# Patient Record
Sex: MALE | Race: WHITE | NOT HISPANIC OR LATINO | URBAN - METROPOLITAN AREA
[De-identification: names, ages, dates, MRNs, and addresses within clinical notes are randomized per-mention and may not be internally consistent; named-entity substitution may affect disease eponyms.]

---

## 2023-07-25 LAB
APPEARANCE, URINE: CLEAR
BILIRUBIN, URINE: NEGATIVE
BLOOD, URINE: NEGATIVE
CALCIDIOL (25 OH VITAMIN D3) (NG/ML) IN SER/PLAS: 58 NG/ML
CHOLESTEROL (MG/DL) IN SER/PLAS: 116 MG/DL (ref 0–199)
CHOLESTEROL IN HDL (MG/DL) IN SER/PLAS: 53.3 MG/DL
CHOLESTEROL/HDL RATIO: 2.2
COLOR, URINE: YELLOW
GLUCOSE, URINE: NEGATIVE MG/DL
KETONES, URINE: NEGATIVE MG/DL
LDL: 47 MG/DL (ref 0–99)
LEUKOCYTE ESTERASE, URINE: NEGATIVE
NITRITE, URINE: NEGATIVE
PH, URINE: 6 (ref 5–8)
PROSTATE SPECIFIC ANTIGEN,SCREEN: 1.63 NG/ML (ref 0–4)
PROTEIN, URINE: NEGATIVE MG/DL
SPECIFIC GRAVITY, URINE: 1.02 (ref 1–1.03)
THYROTROPIN (MIU/L) IN SER/PLAS BY DETECTION LIMIT <= 0.05 MIU/L: 1.84 MIU/L (ref 0.44–3.98)
THYROXINE (T4) FREE (NG/DL) IN SER/PLAS: 1.07 NG/DL (ref 0.78–1.48)
TRIGLYCERIDE (MG/DL) IN SER/PLAS: 81 MG/DL (ref 0–149)
URATE (MG/DL) IN SER/PLAS: 3.6 MG/DL (ref 4–7.5)
UROBILINOGEN, URINE: <2 MG/DL (ref 0–1.9)
VLDL: 16 MG/DL (ref 0–40)

## 2023-07-26 LAB — URINE CULTURE: NORMAL

## 2024-07-31 ENCOUNTER — LAB REQUISITION (OUTPATIENT)
Dept: LAB | Facility: LAB | Age: 79
End: 2024-07-31

## 2024-07-31 DIAGNOSIS — E78.5 HYPERLIPIDEMIA, UNSPECIFIED: ICD-10-CM

## 2024-07-31 DIAGNOSIS — Z12.5 ENCOUNTER FOR SCREENING FOR MALIGNANT NEOPLASM OF PROSTATE: ICD-10-CM

## 2024-07-31 DIAGNOSIS — I10 ESSENTIAL (PRIMARY) HYPERTENSION: ICD-10-CM

## 2024-07-31 DIAGNOSIS — E03.9 HYPOTHYROIDISM, UNSPECIFIED: ICD-10-CM

## 2024-07-31 LAB
25(OH)D3 SERPL-MCNC: 61 NG/ML (ref 30–100)
CHOLEST SERPL-MCNC: 158 MG/DL (ref 0–199)
CHOLESTEROL/HDL RATIO: 2.5
HDLC SERPL-MCNC: 64 MG/DL
LDLC SERPL CALC-MCNC: 75 MG/DL
NON HDL CHOLESTEROL: 94 MG/DL (ref 0–149)
PSA SERPL-MCNC: 2.58 NG/ML
T4 FREE SERPL-MCNC: 1.49 NG/DL (ref 0.78–1.48)
TRIGL SERPL-MCNC: 95 MG/DL (ref 0–149)
TSH SERPL-ACNC: 2.5 MIU/L (ref 0.44–3.98)
URATE SERPL-MCNC: <1.5 MG/DL (ref 4–7.5)
VLDL: 19 MG/DL (ref 0–40)

## 2024-08-01 LAB
APPEARANCE UR: CLEAR
BACTERIA UR CULT: NORMAL
BILIRUB UR STRIP.AUTO-MCNC: NEGATIVE MG/DL
COLOR UR: NORMAL
GLUCOSE UR STRIP.AUTO-MCNC: NORMAL MG/DL
KETONES UR STRIP.AUTO-MCNC: NEGATIVE MG/DL
LEUKOCYTE ESTERASE UR QL STRIP.AUTO: NEGATIVE
NITRITE UR QL STRIP.AUTO: NEGATIVE
PH UR STRIP.AUTO: 7 [PH]
PROT UR STRIP.AUTO-MCNC: NEGATIVE MG/DL
RBC # UR STRIP.AUTO: NEGATIVE /UL
SP GR UR STRIP.AUTO: 1.02
UROBILINOGEN UR STRIP.AUTO-MCNC: NORMAL MG/DL

## 2024-10-08 DIAGNOSIS — I10 HYPERTENSION, UNSPECIFIED TYPE: ICD-10-CM

## 2024-10-08 DIAGNOSIS — E03.9 HYPOTHYROIDISM, UNSPECIFIED TYPE: Primary | ICD-10-CM

## 2024-10-08 RX ORDER — ATENOLOL AND CHLORTHALIDONE TABLET 50; 25 MG/1; MG/1
1 TABLET ORAL DAILY
COMMUNITY
End: 2024-10-08 | Stop reason: SDUPTHER

## 2024-10-08 RX ORDER — AMLODIPINE BESYLATE 5 MG/1
5 TABLET ORAL DAILY
Qty: 90 TABLET | Refills: 3 | Status: SHIPPED | OUTPATIENT
Start: 2024-10-08

## 2024-10-08 RX ORDER — AMLODIPINE BESYLATE 5 MG/1
5 TABLET ORAL DAILY
COMMUNITY
End: 2024-10-08 | Stop reason: SDUPTHER

## 2024-10-08 RX ORDER — LEVOTHYROXINE SODIUM 100 UG/1
100 TABLET ORAL DAILY
Qty: 90 TABLET | Refills: 3 | Status: SHIPPED | OUTPATIENT
Start: 2024-10-08

## 2024-10-08 RX ORDER — ATENOLOL AND CHLORTHALIDONE TABLET 50; 25 MG/1; MG/1
1 TABLET ORAL DAILY
Qty: 90 TABLET | Refills: 3 | Status: SHIPPED | OUTPATIENT
Start: 2024-10-08

## 2024-10-08 RX ORDER — LEVOTHYROXINE SODIUM 100 UG/1
100 TABLET ORAL DAILY
COMMUNITY
End: 2024-10-08 | Stop reason: SDUPTHER

## 2025-05-06 ENCOUNTER — OFFICE VISIT (OUTPATIENT)
Dept: ORTHOPEDIC SURGERY | Facility: HOSPITAL | Age: 80
End: 2025-05-06
Payer: MEDICARE

## 2025-05-06 ENCOUNTER — HOSPITAL ENCOUNTER (OUTPATIENT)
Dept: RADIOLOGY | Facility: HOSPITAL | Age: 80
Discharge: HOME | End: 2025-05-06
Payer: MEDICARE

## 2025-05-06 DIAGNOSIS — M25.551 BILATERAL HIP PAIN: ICD-10-CM

## 2025-05-06 DIAGNOSIS — M25.552 PAIN OF BOTH HIP JOINTS: ICD-10-CM

## 2025-05-06 DIAGNOSIS — M25.551 PAIN OF BOTH HIP JOINTS: ICD-10-CM

## 2025-05-06 DIAGNOSIS — M76.892 HIP ABDUCTOR TENDONITIS, LEFT: Primary | ICD-10-CM

## 2025-05-06 DIAGNOSIS — M25.552 BILATERAL HIP PAIN: ICD-10-CM

## 2025-05-06 PROCEDURE — 99203 OFFICE O/P NEW LOW 30 MIN: CPT | Performed by: FAMILY MEDICINE

## 2025-05-06 PROCEDURE — 99213 OFFICE O/P EST LOW 20 MIN: CPT | Performed by: FAMILY MEDICINE

## 2025-05-06 PROCEDURE — 73522 X-RAY EXAM HIPS BI 3-4 VIEWS: CPT | Mod: BILATERAL PROCEDURE | Performed by: RADIOLOGY

## 2025-05-06 PROCEDURE — 1159F MED LIST DOCD IN RCRD: CPT | Performed by: FAMILY MEDICINE

## 2025-05-06 PROCEDURE — 73522 X-RAY EXAM HIPS BI 3-4 VIEWS: CPT

## 2025-05-06 NOTE — PROGRESS NOTES
Sports Medicine Office Note    Today's Date:  05/06/2025     HPI: Alfredo Clemente is a 79 y.o. currently retired, frequent golfer who presents today for left lateral hip pain.    Today, on 05/06/2025, he presents as new patient evaluation for multiple years of bilateral lateral hip pain, now currently mostly left sided pain.  He has been evaluated by his [facility] primary care physician Dr. Jj Schmitt with a greater trochanter steroid injection approximately 5 years ago on both hips.  He reports full relief in his right hip but over the persistent symptoms in his left hip with a gradual progression of left lateral hip pain over the past few months with no specific injury.  He is a side sleeper and reports waking up in the middle of the night with pain after laying on his left side.  He did home exercises when treatment was started but no formal physical therapy.  He is currently taking prn Tylenol, ibuprofen, and small quantity of Voltaren for pain relief.    He has no other complaints.    Physical Examination:     The Bilateral hip and pelvis are without obvious signs of acute bony deformity or instability. Active and passive range of motion are full and painful. Log roll is negative. Straight leg raise test is negative. Kali is negative. Crossover is negative. Hip strength is symmetrical both sides.  There is tenderness over greater trochanter and gluteus medius/gluteus minimus attachments.  There is lateral hip pain with resisted external rotation.  The opposite hip is otherwise nontender and stable. Gait is antalgic and tandem.    Imaging:  Radiographs of the bilateral hips were reviewed and revealed mild DJD with no significant joint space narrowing or osseous abnormality.  The studies were reviewed by me and Dr. Lyon personally in the office today.    1. Hip abductor tendonitis, left  Referral to Physical Therapy      2. Bilateral hip pain  XR hips bilateral 3 or 4 VW w pelvis when performed    Referral to  Orthopedics and Sports Medicine    Referral to Physical Therapy        Assessment and Plan:     We reviewed the exam findings and discussed conservative treatment options. We agreed on starting conservative treatment for hip abductor tendonitis. They will start formal physical therapy. They will also start using appropriate dosing of topical voltaren gel up to QID for analgesia. We will bring them back in 6 weeks to monitor his response to conservative treatments.    **This note was dictated using Dragon speech recognition software and was not corrected for spelling or grammatical errors**.    Ashish Suresh MD  Sports Medicine Fellow   [facility]

## 2025-06-02 ENCOUNTER — EVALUATION (OUTPATIENT)
Dept: PHYSICAL THERAPY | Facility: CLINIC | Age: 80
End: 2025-06-02
Payer: MEDICARE

## 2025-06-02 DIAGNOSIS — M25.551 BILATERAL HIP PAIN: ICD-10-CM

## 2025-06-02 DIAGNOSIS — M25.552 BILATERAL HIP PAIN: ICD-10-CM

## 2025-06-02 DIAGNOSIS — M76.892 HIP ABDUCTOR TENDONITIS, LEFT: ICD-10-CM

## 2025-06-02 PROCEDURE — 97140 MANUAL THERAPY 1/> REGIONS: CPT | Mod: GP

## 2025-06-02 PROCEDURE — 97161 PT EVAL LOW COMPLEX 20 MIN: CPT | Mod: GP

## 2025-06-02 NOTE — PROGRESS NOTES
Physical Therapy    Physical Therapy Evaluation and Treatment    Patient Name: Alfredo Clemente  MRN: [id number]  Today's Date: 6/2/2025  Time Calculation  Start Time: 1730  Stop Time: 1805  Time Calculation (min): 35 min    Insurance:  Visit number: 1  Authorization info: no auth, med nec  Insurance Type: Payor: MEDICARE / Plan: MEDICARE PART A AND B / Product Type: *No Product type* /     Current Problem  1. Bilateral hip pain  Referral to Physical Therapy    Follow Up In Physical Therapy      2. Hip abductor tendonitis, left  Referral to Physical Therapy    Follow Up In Physical Therapy          General  79 yoM presenting to therapy for Left lateral hip pain consistent with greater trochanteric pain syndrome and possible greater trochanter bursitis. Treatment should focus on restoring joint mobility (especially IR in various levels of flexion) and gluteal strengthening.     Precautions  None    SUBJECTIVE:   Referred for: Left lateral hip pain  Primary complaint is Left lateral hip pain that awakens him from sleeping at night. It only bothers him when he lays on his side. Left hip can hurt whether he's laying on Left or Right side, it doesn't matter. Symptoms reduce if he lays supine with legs straight. His hip doesn't bother him during the day. Actually feels better when he's moving. He can golf and work with his  without issue. He is an avid walker. Walks 45-60 minutes/day. Occasionally his hip gets a little sore towards the end of the walk. Retired. . [place] in winter. Enjoys traveling to visit his kids and grandkids.     Patient/Family Goal: relieve pain    Outcome Measures: LEFS 78/80    Ortho ([facility]) Note on 5/6/25  Today, on 05/06/2025, he presents as new patient evaluation for multiple years of bilateral lateral hip pain, now currently mostly left sided pain.  He has been evaluated by his  primary care physician Dr. Jj Schmitt with a greater trochanter steroid injection  approximately 5 years ago on both hips.  He reports full relief in his right hip but over the persistent symptoms in his left hip with a gradual progression of left lateral hip pain over the past few months with no specific injury.  He is a side sleeper and reports waking up in the middle of the night with pain after laying on his left side.  He did home exercises when treatment was started but no formal physical therapy.  He is currently taking prn Tylenol, ibuprofen, and small quantity of Voltaren for pain relief. We reviewed the exam findings and discussed conservative treatment options. We agreed on starting conservative treatment for hip abductor tendonitis. They will start formal physical therapy. They will also start using appropriate dosing of topical voltaren gel up to QID for analgesia. We will bring them back in 6 weeks to monitor his response to conservative treatments     Imaging:   Bilateral Hip Xray 05/2025  FINDINGS:  No acute fracture is identified. No dislocation is seen. There are no  lytic or blastic lesions. No radiopaque foreign bodies are noted.  Degenerative changes which include joint space narrowing, spurring,  subchondral sclerosis. Sacroiliac joints appear symmetric. There is  no diastasis of the pubic symphysis.  IMPRESSION:  No radiographic evidence of an acute fracture.      OBJECTIVE:    Palpation: familiar tenderness lateral hip and gluteal inesrtion    Hip AROM:  Flex: WFL  IR at 90: 50% impaired  ER at 90: <25% impaired  IR at 0: 50% impaired  Abd: 25% impaired  Ext: 25% impaired    Hip Joint Mobility  Inferior glide: Hypomobile  Lateral glide: Hypomobile    Hip Special Tests  TRINY: Negative R/L  FADIR: Negative R, Positive L    LE Strength:  Hip abd: 4/5 and painfree  Hip ext: 4/5 and painfree    Gait: independent without device; non-antalgic    Treatments:  Therapeutic Exercise: (5 minutes)   Instruction in HEP - below    Manual Therapy: (10 minutes)  Hip PROM with belt -  IR/ER/flexion  Lateral glide with belt     HEP:  Access Code: [email or web address]  URL: [email or web address]  Date: 06/02/2025  Prepared by: Rachel Ruffing    Exercises  - Supine Piriformis Stretch with Foot on Ground  - 1 x daily - 7 x weekly - 1 sets - 5 reps - 20-40 hold  - Supine Bilateral Hip Internal Rotation Stretch  - 1 x daily - 7 x weekly - 1 sets - 5 reps - 20-40 hold  - Supine Bridge  - 1 x daily - 7 x weekly - 3 sets - 10 reps - 3 hold  - Clamshell  - 1 x daily - 7 x weekly - 3 sets - 10 reps - 3 hold    Response to treatment: Good, felt a little better    ASSESSMENT:   79 yoM presenting to therapy for Left lateral hip pain consistent with greater trochanteric pain syndrome and possible greater trochanter bursitis. Treatment should focus on restoring joint mobility (especially IR in various levels of flexion) and gluteal strengthening. This patient could benefit from physical therapy to reduce pain, improve identified impairments, restore functional mobility and achieve all established goals.       PLAN:  OP PT PLAN:  Treatment/Interventions: Dry Needling  , Electrical Stimulation , Gait training , Manual Therapy  , Neuromuscular re-education , Taping techniques , Therapeutic activities , Therapeutic exercise  , and Ultrasound   PT Plan: Skilled PT   PT Frequency: 1-2 times per week  Duration: 8 visits  Certification Period Start Date: 6/3/25  Certification Period End Date: 9/1/25  Visits Approved: no auth, med nec  Rehab Potential: Good  Plan of Care Agreement: Patient         Goals:   Pt will report minimal to no pain with all iADL's, ADL's and functional activities to allow for full participation in daily activities  Pt will be independent with home exercise program to allow for program progression and discharge  Outcome score reported will improve by MCID to indicate an improvement in overall function  4.   Right hip AROM WFL in all planes to achieve optimal ROM outcomes and allow for independent  functional mobility  5.   Right hip strength strong (5/5) and painfree all planes to allow for independent functional mobility  6.   Pt will be able to sleep on Right or Left side without interruptions to improve quality of sleep   7.   Pt will be able to walk 45-60 minutes without device to improve functional mobility

## 2025-06-13 ENCOUNTER — TREATMENT (OUTPATIENT)
Dept: PHYSICAL THERAPY | Facility: CLINIC | Age: 80
End: 2025-06-13
Payer: MEDICARE

## 2025-06-13 DIAGNOSIS — M25.552 BILATERAL HIP PAIN: ICD-10-CM

## 2025-06-13 DIAGNOSIS — M76.892 HIP ABDUCTOR TENDONITIS, LEFT: ICD-10-CM

## 2025-06-13 DIAGNOSIS — M25.551 BILATERAL HIP PAIN: ICD-10-CM

## 2025-06-13 PROCEDURE — 97110 THERAPEUTIC EXERCISES: CPT | Mod: GP

## 2025-06-13 PROCEDURE — 97140 MANUAL THERAPY 1/> REGIONS: CPT | Mod: GP

## 2025-06-13 NOTE — PROGRESS NOTES
"Physical Therapy  Physical Therapy Treatment Note    Patient Name: Alfredo Clemente  MRN: [id number]  Today's Date: 6/13/2025  Time Calculation  Start Time: 1330  Stop Time: 1408  Time Calculation (min): 38 min  PT Therapeutic Procedures Time Entry  Therapeutic Exercise Time Entry: 13  Manual Therapy Time Entry: 25        Insurance:  Visit number: 2  Authorization info: no auth, med nec  Insurance Type: Payor: MEDICARE / Plan: MEDICARE PART A AND B / Product Type: *No Product type* /   Current Problem  1. Bilateral hip pain  Follow Up In Physical Therapy      2. Hip abductor tendonitis, left  Follow Up In Physical Therapy        General  79 yoM presenting to therapy for Left lateral hip pain consistent with greater trochanteric pain syndrome and possible greater trochanter bursitis. Treatment should focus on restoring joint mobility (especially IR in various levels of flexion) and gluteal strengthening.      Precautions  None    Subjective:   Subjective   Patient reports not much change. Pain still wakes him up around 4 am every night. Also experienced some soreness while golfing    Pain  No pain currently    Objective:   Palpation: familiar tenderness lateral hip and gluteal inesrtion     Hip AROM:  Flex: WFL  IR at 90: 50% impaired  ER at 90: <25% impaired  IR at 0: 50% impaired  Abd: 25% impaired  Ext: 25% impaired     Hip Joint Mobility  Inferior glide: Hypomobile  Lateral glide: Hypomobile     Hip Special Tests  TRINY: Negative R/L  FADIR: Negative R, Positive L     LE Strength:  Hip abd: 4/5 and painfree  Hip ext: 4/5 and painfree     Gait: independent without device; non-antalgic    Treatments:   THERE EX 13  Hip add iso x 20  Hip abd iso tendon loading 45\" x 4  Banded bridge with belt x 20    MANUAL 25  Prone PROM hip IR, knee flexion  Long axis traction   Hip PROM with belt - IR/ER/flexion  Lateral glide with belt    NMRE    HEP  Access Code: [email or web address]  URL: [email or web address]  Date: " 06/02/2025  Prepared by: Rachel Norton     Exercises  - Supine Piriformis Stretch with Foot on Ground  - 1 x daily - 7 x weekly - 1 sets - 5 reps - 20-40 hold  - Supine Bilateral Hip Internal Rotation Stretch  - 1 x daily - 7 x weekly - 1 sets - 5 reps - 20-40 hold  - Supine Bridge  - 1 x daily - 7 x weekly - 3 sets - 10 reps - 3 hold  - Clamshell  - 1 x daily - 7 x weekly - 3 sets - 10 reps - 3 hold    Assessment:  Visit focused on manual therapy to improve joint mobility of Left hip. Added low load long duration isometrics to improve hip abduction strength     PLAN:  OP PT PLAN:  Treatment/Interventions: Dry Needling  , Electrical Stimulation , Gait training , Manual Therapy  , Neuromuscular re-education , Taping techniques , Therapeutic activities , Therapeutic exercise  , and Ultrasound   PT Plan: Skilled PT   PT Frequency: 1-2 times per week  Duration: 8 visits  Certification Period Start Date: 6/3/25  Certification Period End Date: 9/1/25  Visits Approved: no auth, med nec  Rehab Potential: Good  Plan of Care Agreement: Patient       Goals:   Pt will report minimal to no pain with all iADL's, ADL's and functional activities to allow for full participation in daily activities  Pt will be independent with home exercise program to allow for program progression and discharge  Outcome score reported will improve by MCID to indicate an improvement in overall function  4.   Right hip AROM WFL in all planes to achieve optimal ROM outcomes and allow for independent functional mobility  5.   Right hip strength strong (5/5) and painfree all planes to allow for independent functional mobility  6.   Pt will be able to sleep on Right or Left side without interruptions to improve quality of sleep   7.   Pt will be able to walk 45-60 minutes without device to improve functional mobility

## 2025-06-16 ENCOUNTER — TREATMENT (OUTPATIENT)
Dept: PHYSICAL THERAPY | Facility: CLINIC | Age: 80
End: 2025-06-16
Payer: MEDICARE

## 2025-06-16 DIAGNOSIS — M76.892 HIP ABDUCTOR TENDONITIS, LEFT: ICD-10-CM

## 2025-06-16 DIAGNOSIS — M25.551 BILATERAL HIP PAIN: ICD-10-CM

## 2025-06-16 DIAGNOSIS — M25.552 BILATERAL HIP PAIN: ICD-10-CM

## 2025-06-16 PROCEDURE — 97140 MANUAL THERAPY 1/> REGIONS: CPT | Mod: GP

## 2025-06-16 NOTE — PROGRESS NOTES
"Physical Therapy  Physical Therapy Treatment Note    Patient Name: Alfredo Clemente  MRN: [id number]  Today's Date: 6/16/2025  Time Calculation  Start Time: 1645  Stop Time: 1715  Time Calculation (min): 30 min  PT Therapeutic Procedures Time Entry  Manual Therapy Time Entry: 30        Insurance:  Visit number: 2  Authorization info: no auth, med nec  Insurance Type: Payor: MEDICARE / Plan: MEDICARE PART A AND B / Product Type: *No Product type* /   Current Problem  1. Bilateral hip pain  Follow Up In Physical Therapy      2. Hip abductor tendonitis, left  Follow Up In Physical Therapy        General  79 yoM presenting to therapy for Left lateral hip pain consistent with greater trochanteric pain syndrome and possible greater trochanter bursitis. Treatment should focus on restoring joint mobility (especially IR in various levels of flexion) and gluteal strengthening.      Precautions  None    Subjective:   Subjective   Patient reports not much change. Pain still wakes him up around 4 am every night. Uses Voltaren gel during the day to help manage soreness after prolonged walking. He has been compliant with the exercises provided and also does hip exercises with his . Sees ortho tomorrow    Pain  No pain currently    Objective:   Palpation: familiar tenderness lateral hip and gluteal inesrtion     Hip AROM:  Flex: WFL  IR at 90: 50% impaired  ER at 90: <25% impaired  IR at 0: 50% impaired  Abd: 25% impaired  Ext: 25% impaired     Hip Joint Mobility  Inferior glide: Hypomobile  Lateral glide: Hypomobile     Hip Special Tests  TRINY: Negative R/L  FADIR: Negative R, Positive L     LE Strength:  Hip abd: 4/5 and painfree  Hip ext: 4/5 and painfree     Gait: independent without device; non-antalgic    Treatments:   THERE EX NOT TODAY  Hip add iso x 20  Hip abd iso tendon loading 45\" x 4  Banded bridge with belt x 20    MANUAL 30  STM, massage gun posterolateral hip  Long axis traction   Hip PROM with belt - " IR/ER/flexion  Lateral glide with belt    FAIZA    [facility]  Access Code: [email or web address]  URL: [email or web address]  Date: 06/02/2025  Prepared by: Rachel Norton     Exercises  - Supine Piriformis Stretch with Foot on Ground  - 1 x daily - 7 x weekly - 1 sets - 5 reps - 20-40 hold  - Supine Bilateral Hip Internal Rotation Stretch  - 1 x daily - 7 x weekly - 1 sets - 5 reps - 20-40 hold  - Supine Bridge  - 1 x daily - 7 x weekly - 3 sets - 10 reps - 3 hold  - Clamshell  - 1 x daily - 7 x weekly - 3 sets - 10 reps - 3 hold    Assessment:  Visit focused on manual therapy to improve joint mobility of Left hip. Also performed soft tissue mobilizations to Left posterolateral hip. Held exercises today because pt exercised with his  earlier today.      PLAN:  Sees ortho tomorrow - will see if he is a candidate for an injection    OP PT PLAN:  Treatment/Interventions: Dry Needling  , Electrical Stimulation , Gait training , Manual Therapy  , Neuromuscular re-education , Taping techniques , Therapeutic activities , Therapeutic exercise  , and Ultrasound   PT Plan: Skilled PT   PT Frequency: 1-2 times per week  Duration: 8 visits  Certification Period Start Date: 6/3/25  Certification Period End Date: 9/1/25  Visits Approved: no auth, med nec  Rehab Potential: Good  Plan of Care Agreement: Patient       Goals:   Pt will report minimal to no pain with all iADL's, ADL's and functional activities to allow for full participation in daily activities  Pt will be independent with home exercise program to allow for program progression and discharge  Outcome score reported will improve by MCID to indicate an improvement in overall function  4.   Right hip AROM WFL in all planes to achieve optimal ROM outcomes and allow for independent functional mobility  5.   Right hip strength strong (5/5) and painfree all planes to allow for independent functional mobility  6.   Pt will be able to sleep on Right or Left side without interruptions  to improve quality of sleep   7.   Pt will be able to walk 45-60 minutes without device to improve functional mobility

## 2025-06-17 ENCOUNTER — OFFICE VISIT (OUTPATIENT)
Dept: ORTHOPEDIC SURGERY | Facility: HOSPITAL | Age: 80
End: 2025-06-17
Payer: MEDICARE

## 2025-06-17 DIAGNOSIS — M54.16 LEFT LUMBAR RADICULOPATHY: Primary | ICD-10-CM

## 2025-06-17 DIAGNOSIS — M76.892 HIP ABDUCTOR TENDONITIS, LEFT: ICD-10-CM

## 2025-06-17 DIAGNOSIS — M43.16 SPONDYLOLISTHESIS AT L4-L5 LEVEL: ICD-10-CM

## 2025-06-17 PROCEDURE — 1125F AMNT PAIN NOTED PAIN PRSNT: CPT | Performed by: FAMILY MEDICINE

## 2025-06-17 PROCEDURE — 99214 OFFICE O/P EST MOD 30 MIN: CPT | Performed by: FAMILY MEDICINE

## 2025-06-17 PROCEDURE — 1159F MED LIST DOCD IN RCRD: CPT | Performed by: FAMILY MEDICINE

## 2025-06-17 PROCEDURE — 99212 OFFICE O/P EST SF 10 MIN: CPT | Performed by: FAMILY MEDICINE

## 2025-06-17 RX ORDER — GABAPENTIN 100 MG/1
CAPSULE ORAL
Qty: 99 CAPSULE | Refills: 0 | Status: SHIPPED | OUTPATIENT
Start: 2025-06-17 | End: 2025-07-23

## 2025-06-17 ASSESSMENT — PAIN - FUNCTIONAL ASSESSMENT: PAIN_FUNCTIONAL_ASSESSMENT: 0-10

## 2025-06-17 ASSESSMENT — PAIN SCALES - GENERAL: PAINLEVEL_OUTOF10: 6

## 2025-06-18 NOTE — PROGRESS NOTES
Sports Medicine Office Note    Today's Date:  06/17/2025     HPI: Alfredo Clemente is a 79 y.o. currently retired, frequent golfer who presents today for left lateral hip pain.    On 5/6/2025, he presents as new patient evaluation for multiple years of bilateral lateral hip pain, now currently mostly left sided pain.  He has been evaluated by his [facility] primary care physician Dr. Jj Schmitt with a greater trochanter steroid injection approximately 5 years ago on both hips.  He reports full relief in his right hip but over the persistent symptoms in his left hip with a gradual progression of left lateral hip pain over the past few months with no specific injury.  He is a side sleeper and reports waking up in the middle of the night with pain after laying on his left side.  He did home exercises when treatment was started but no formal physical therapy.  He is currently taking prn Tylenol, ibuprofen, and small quantity of Voltaren for pain relief.  We agreed on starting conservative treatment for hip abductor tendonitis. They will start formal physical therapy. They will also start using appropriate dosing of topical voltaren gel up to QID for analgesia. We will bring them back in 6 weeks to monitor his response to conservative treatments.    Today, 6/17/2025 returns for his 6-week follow-up, he reports his pain is grossly unchanged since his last visit.  His pain at his last visit was originally localized over his left greater trochanter.  He reports since starting physical therapy his pain has changed some and now progresses down the lateral aspect of his leg from his hip past his knee down to his lower leg.  He originally reported this was a very difficult sensation than his prior sciatica pain and lumbar surgery at [facility] but towards the end of our conversation felt like his pain was actually pretty similar to his prior lumbar pain that had resolved after his prior lumbar surgery years prior.  He  denies any bowel/bladder dysfunction, weakness or numbness.  He continues to use topical Voltaren gel but a small dose only over his greater trochanter.  He notes his left lateral hip pain is notably worse in the middle of the night, he will get up and walk a few steps and his pain actually goes away.  Not currently being activated by golfing or walking.    He has no other complaints.    Physical Examination:     The Bilateral hip and pelvis are without obvious signs of acute bony deformity or instability. Active and passive range of motion are full and painful. Log roll is negative. Straight leg raise test is negative. Kali is negative. Crossover is negative. Hip strength is symmetrical both sides.  There is tenderness over greater trochanter and IT band.  Positive Netta's.  Nontender gluteus medius/gluteus minimus attachments.  There is lateral hip pain with resisted external rotation.  The opposite hip is otherwise nontender and stable. Gait is antalgic and tandem.    Imaging:  Radiographs of the bilateral hips were reviewed and revealed mild DJD with no significant joint space narrowing or osseous abnormality.    === 05/06/25 ===  XR HIPS BILATERAL 3 OR 4 VW WITH PELVIS WHEN PERFORMED  - Impression -  No radiographic evidence of an acute fracture.  Signed by: Lucio James 5/7/2025 6:30 PM  Dictation workstation:   [id number]    7/14/2022 10:27 am  SPINE, LUMBOSACRAL  MIN 4 VIEWS;   FINDINGS:  Five views of the lumbar spine including  AP, lateral, lateral  cone-down and bilateral oblique views were obtained.  There is no  acute fracture identified.  There is mild retrolisthesis L4 on L5.  Mild-to-moderate facet degenerative changes are seen throughout the  mid to lower lumbar spine. Moderate disc space narrowing and marginal  osteophyte formation is seen at the L4-5 level. There is no evidence  of pars interarticularis defect.      1. Left lumbar radiculopathy  gabapentin (Neurontin) 100 mg capsule      2.  Spondylolisthesis at L4-L5 level  gabapentin (Neurontin) 100 mg capsule      3. Hip abductor tendonitis, left          Assessment and Plan:     We reviewed the exam findings and discussed conservative treatment options.  His pain appears multifactorial today, however the symptoms that seem to be aggravating the most are a pain from his lateral hip down his lateral lower leg that goes past his knee consistent with a lumbar radiculopathy.  He notes a prior spinal surgery at [facility].  We will start him on gabapentin with a dose starting at 100 mg at bedtime and can be progressed gradually every 3 days if additional symptomatic relief is needed.  Patient was encouraged to follow-up with his spinal surgeon at [facility] and was also offered contact information for a  spine surgeon.  We discussed prioritizing following up with a spine specialist but patient understands if his lateral hip pain more focal over his greater trochanter returns he can return to be reevaluated to discuss additional treatment measures.  All questions answered and patient very comfortable with this plan.    **This note was dictated using Dragon speech recognition software and was not corrected for spelling or grammatical errors**.    Ashish Suresh MD  Sports Medicine Fellow   [facility]   Name band;

## 2025-07-15 DIAGNOSIS — M54.16 LEFT LUMBAR RADICULOPATHY: ICD-10-CM

## 2025-07-15 DIAGNOSIS — M43.16 SPONDYLOLISTHESIS AT L4-L5 LEVEL: ICD-10-CM

## 2025-07-15 RX ORDER — GABAPENTIN 100 MG/1
CAPSULE ORAL
Qty: 99 CAPSULE | Refills: 0 | Status: SHIPPED | OUTPATIENT
Start: 2025-07-15 | End: 2025-08-20

## 2026-05-18 ENCOUNTER — APPOINTMENT (OUTPATIENT)
Dept: DERMATOLOGY | Facility: CLINIC | Age: 81
End: 2026-05-18
Payer: MEDICARE